# Patient Record
Sex: FEMALE | URBAN - METROPOLITAN AREA
[De-identification: names, ages, dates, MRNs, and addresses within clinical notes are randomized per-mention and may not be internally consistent; named-entity substitution may affect disease eponyms.]

---

## 2021-04-06 ENCOUNTER — VIRTUAL VISIT (OUTPATIENT)
Dept: PEDIATRICS CLINIC | Age: 21
End: 2021-04-06
Payer: COMMERCIAL

## 2021-04-06 DIAGNOSIS — Z20.822 ENCOUNTER FOR LABORATORY TESTING FOR COVID-19 VIRUS: Primary | ICD-10-CM

## 2021-04-06 PROBLEM — J30.2 SEASONAL ALLERGIES: Status: ACTIVE | Noted: 2021-04-06

## 2021-04-06 PROBLEM — M21.40 PES PLANUS: Status: ACTIVE | Noted: 2021-04-06

## 2021-04-06 PROCEDURE — 99422 OL DIG E/M SVC 11-20 MIN: CPT | Performed by: NURSE PRACTITIONER

## 2021-04-06 RX ORDER — FEXOFENADINE HCL 180 MG/1
TABLET ORAL
COMMUNITY

## 2021-04-06 ASSESSMENT — ENCOUNTER SYMPTOMS
WHEEZING: 0
EYE DISCHARGE: 0
NAUSEA: 0
SINUS PAIN: 0
EYE ITCHING: 0
COUGH: 0
EYE REDNESS: 0
RHINORRHEA: 1
SINUS PRESSURE: 0
CHEST TIGHTNESS: 0
SORE THROAT: 0
TROUBLE SWALLOWING: 0
EYE PAIN: 0
VOMITING: 0
ABDOMINAL PAIN: 0
SHORTNESS OF BREATH: 0

## 2021-04-06 NOTE — PROGRESS NOTES
Indicates a negative item  -- DELETE ALL ITEMS NOT EXAMINED]  [x] Alert  [] Oriented to person/place/time    [x] No apparent distress  [] Toxic appearing    [] Face flushed appearing [] Sclera clear  [] Lips are cyanotic      [x] Breathing appears normal  [] Appears tachypneic      [] Rash on visible skin    [] Cranial Nerves II-XII grossly intact    [] Motor grossly intact in visible upper extremities    [] Motor grossly intact in visible lower extremities    [] Normal Mood  [] Anxious appearing    [] Depressed appearing  [] Confused appearing      [] Poor short term memory  [] Poor long term memory    [] OTHER:      Due to this being a TeleHealth encounter, evaluation of the following organ systems is limited: Vitals/Constitutional/EENT/Resp/CV/GI//MS/Neuro/Skin/Heme-Lymph-Imm. ASSESSMENT/PLAN:  Val was seen today for concern for covid-19. Diagnoses and all orders for this visit:    Encounter for laboratory testing for COVID-19 virus  -     COVID-19 Ambulatory; Future      Side effects, adverse effects of the medication prescribed today, as well as treatment plan/ rationale and result expectations have been discussed with the patient who expresses understanding and desires to proceed. Close follow up to evaluate treatment results and for coordination of care. I have reviewed the patient's medical history in detail and updated the computerized patient record. As always, patient is advised that if symptoms worsen in any way they will proceed to the nearest emergency room. Follow up in 48-72 hours if symptoms persist or worsen and as needed    An  electronic signature was used to authenticate this note.     --JOMAR Pizano - CNP on 4/6/2021 at 9:47 AM        Pursuant to the emergency declaration under the [facility] [address] waiver authority and the [employer] Act, this Virtual  Visit was conducted, with patient's consent, to reduce the patient's risk of exposure to COVID-19 and provide continuity of care for an established patient. Services were provided through a video synchronous discussion virtually to substitute for in-person clinic visit.

## 2021-04-06 NOTE — LETTER
[facility]  [address]  Phone: [phone]  Fax: [phone]    JOMAR Purdy - MARGOT        April 6, 2021     Patient: Isabel Epstein   YOB: 2000   Date of Visit: 4/6/2021       To Whom it May Concern:    Isabel Epstein was seen in my clinic on 4/6/2021. She is having symptoms and her COVID-19 test is pending. Please excuse her from work until results are available.       Sincerely,           Devon Gomez APRN - CNP

## 2021-04-07 DIAGNOSIS — Z20.822 ENCOUNTER FOR LABORATORY TESTING FOR COVID-19 VIRUS: ICD-10-CM

## 2021-04-08 LAB
SARS-COV-2: NOT DETECTED
SOURCE: NORMAL